# Patient Record
Sex: MALE | URBAN - METROPOLITAN AREA
[De-identification: names, ages, dates, MRNs, and addresses within clinical notes are randomized per-mention and may not be internally consistent; named-entity substitution may affect disease eponyms.]

---

## 2020-01-16 ENCOUNTER — OFFICE VISIT (OUTPATIENT)
Dept: URBAN - METROPOLITAN AREA CLINIC 33 | Facility: CLINIC | Age: 35
End: 2020-01-16

## 2020-01-16 DIAGNOSIS — H52.211 IRREGULAR ASTIGMATISM, RIGHT EYE: Primary | ICD-10-CM

## 2020-01-16 PROCEDURE — 92004 COMPRE OPH EXAM NEW PT 1/>: CPT | Performed by: OPTOMETRIST

## 2020-01-16 PROCEDURE — 92025 CPTRIZED CORNEAL TOPOGRAPHY: CPT | Performed by: OPTOMETRIST

## 2020-01-16 ASSESSMENT — INTRAOCULAR PRESSURE
OD: 13
OS: 12

## 2020-01-16 ASSESSMENT — VISUAL ACUITY
OD: 20/20
OS: 20/20

## 2020-01-16 ASSESSMENT — KERATOMETRY
OS: 42.50
OD: 42.75

## 2020-01-16 NOTE — IMPRESSION/PLAN
Impression: Irregular astigmatism, right eye: H52.211. Plan: Patient has irregular astigmatism in the right eye. Patient reports being diagnosed with Keratoconus and fit for RGP lenses. Discussed with patient that today's exam findings do not show keratoconus. No james signs, vogt's striae, or steep K values on topography. Patient denies recent vision changes and sees well with soft contact lenses. Recommend continue soft contact lens wear and repeat corneal topography in 6 months to monitor changes. Ordered and reviewed corneal topography today.